# Patient Record
Sex: FEMALE | Race: OTHER | HISPANIC OR LATINO | Employment: FULL TIME | URBAN - METROPOLITAN AREA
[De-identification: names, ages, dates, MRNs, and addresses within clinical notes are randomized per-mention and may not be internally consistent; named-entity substitution may affect disease eponyms.]

---

## 2023-03-17 ENCOUNTER — HOSPITAL ENCOUNTER (EMERGENCY)
Facility: HOSPITAL | Age: 53
Discharge: HOME | End: 2023-03-18
Attending: EMERGENCY MEDICINE

## 2023-03-17 VITALS
DIASTOLIC BLOOD PRESSURE: 64 MMHG | TEMPERATURE: 98.1 F | RESPIRATION RATE: 18 BRPM | OXYGEN SATURATION: 97 % | WEIGHT: 161.2 LBS | HEART RATE: 81 BPM | SYSTOLIC BLOOD PRESSURE: 121 MMHG | BODY MASS INDEX: 29.66 KG/M2 | HEIGHT: 62 IN

## 2023-03-17 DIAGNOSIS — S99.911A RIGHT ANKLE INJURY, INITIAL ENCOUNTER: Primary | ICD-10-CM

## 2023-03-18 ENCOUNTER — APPOINTMENT (EMERGENCY)
Dept: RADIOLOGY | Facility: HOSPITAL | Age: 53
End: 2023-03-18

## 2023-03-18 RX ORDER — ACETAMINOPHEN 325 MG/1
650 TABLET ORAL ONCE
Status: COMPLETED | OUTPATIENT
Start: 2023-03-18 | End: 2023-03-18

## 2023-03-18 RX ADMIN — ACETAMINOPHEN 650 MG: 325 TABLET, FILM COATED ORAL at 01:41

## 2023-03-18 NOTE — ED PROVIDER NOTES
History  Chief Complaint   Patient presents with   • Ankle Injury     Pt arrived ambulatory with c/o rolling R ankle after stepping on ice cube at work yesterday  Pt report swelling and difficulty stepping on R foot  +pulse     55-year-old female with no reported past medical history presenting to the ED with a complaint of right ankle pain  Patient reports last night she was walking in her driveway, slipped on an ice cube and twisted her ankle  Patient unsure the exact mechanism of injury  States she thinks she rolled her ankle out  She denies any pain or deformity at that time  Reports she has been able to walk on the ankle up until today when she started to have pain in the ankle  She also noticed swelling and some bruising over the lateral aspect of the ankle  She did not fall to the ground or hit her head  She denies any other complaints of pain today  Reports she has been taking Tylenol for the pain which does relieve her pain  She has no pain at rest but it is increased with ambulation  She has no right foot, R medial ankle,R lower leg, R knee, R hip or back pain  No RLE weakness, paresthesias or anesthesias  Denies any symptoms prior to rolling her ankle  Reports prior to this incident she has otherwise been feeling well   used  None       History reviewed  No pertinent past medical history  Past Surgical History:   Procedure Laterality Date   • HYSTERECTOMY  2008       History reviewed  No pertinent family history  I have reviewed and agree with the history as documented  E-Cigarette/Vaping     E-Cigarette/Vaping Substances     Social History     Tobacco Use   • Smoking status: Never   • Smokeless tobacco: Never   Substance Use Topics   • Alcohol use: Never   • Drug use: Never       Review of Systems   Musculoskeletal:        (+) R ankle pain   All other systems reviewed and are negative  Physical Exam  Physical Exam  Vitals and nursing note reviewed  Constitutional:       General: She is not in acute distress  Appearance: She is well-developed  Comments: Awake, alert, interactive and resting in the stretcher in no acute distress  Patient is not ill or toxic appearing  HENT:      Head: Normocephalic and atraumatic  Eyes:      Conjunctiva/sclera: Conjunctivae normal    Cardiovascular:      Rate and Rhythm: Normal rate and regular rhythm  Heart sounds: No murmur heard  Pulmonary:      Effort: Pulmonary effort is normal  No respiratory distress  Breath sounds: Normal breath sounds  Abdominal:      Palpations: Abdomen is soft  Tenderness: There is no abdominal tenderness  Musculoskeletal:         General: No swelling  Cervical back: Neck supple  Comments: TTP, swelling and mild bruising over the right lateral malleoli  There is no deformity  There is no TTP over the right anterior ankle, medial malleoli, forefoot, foot, base of the fifth metatarsal, right lower leg, right knee, or right hip  No skin disruption  No midline or paraspinal TTP over the C/T/L-spine  Full, painless ROM intact in the right ankle  There is no ligamentous laxity over the right ankle  Right PT pulse 2+  Distal cap refill less than 2 seconds  Sensation and motor intact in the RLE  Strength 5/5 with ankle plantarflexion, dorsiflexion, inversion and eversion  No other concerning findings over the RLE  Skin:     General: Skin is warm and dry  Capillary Refill: Capillary refill takes less than 2 seconds  Neurological:      Mental Status: She is alert     Psychiatric:         Mood and Affect: Mood normal          Vital Signs  ED Triage Vitals [03/17/23 2311]   Temperature Pulse Respirations Blood Pressure SpO2   98 1 °F (36 7 °C) 81 18 121/64 97 %      Temp Source Heart Rate Source Patient Position - Orthostatic VS BP Location FiO2 (%)   Tympanic Monitor Sitting Left arm --      Pain Score       --           Vitals:    03/17/23 2311   BP: 121/64   Pulse: 81   Patient Position - Orthostatic VS: Sitting         Visual Acuity      ED Medications  Medications   acetaminophen (TYLENOL) tablet 650 mg (has no administration in time range)       Diagnostic Studies  Results Reviewed     None                 XR ankle 3+ views RIGHT    (Results Pending)              Procedures  Procedures         ED Course  ED Course as of 03/18/23 2203   Sat Mar 18, 2023   0200 No acute osseous abnormality appreciated on right ankle x-ray  Pending official read  7810 Will provide patient with crutches and an Aircast in light of no acute osseous abnormality and no ligamentous laxity on right ankle exam   0221 Advised patient to continue acetaminophen as needed for pain control  Advised to consider ice as needed for symptomatic control  Use crutches as needed for ambulatory support  Follow-up PCP in 2 days for reevaluation and further management  Strict return precautions verbally communicated to the patient as outlined in the AVS   All patient questions and concerns were answered  Patient verbally communicated their understanding and agreement to the above plan  Patient stable at discharge  Portions of the record may have been created with voice recognition software  Occasional wrong word or "sound a like" substitutions may have occurred due to the inherent limitations of voice recognition software  Read the chart carefully and recognize, using context, where substitutions have occurred  Medical Decision Making  DDX including but not limited to: sprain, strain, fracture  Will obtain x-ray imaging of the right ankle for further evaluation  Will provide pain control with acetaminophen  No other indications at this time for further labs or imaging  Will reevaluate after x-ray      Right ankle injury, initial encounter: acute illness or injury      Disposition  Final diagnoses:   None     ED Disposition None      Follow-up Information    None         Patient's Medications    No medications on file       No discharge procedures on file      PDMP Review     None          ED Provider  Electronically Signed by           Geno Garcia PA-C  03/18/23 6615

## 2023-03-18 NOTE — DISCHARGE INSTRUCTIONS
Vuelva al ED si tiene alguna inquietud mary se describe en el AVS o si tiene cualquier otra inquietud  Lenin un seguimiento con pereyra proveedor de atención primaria en 2 días para shine reevaluación y [employer]     Use paracetamol según sea necesario para controlar el dolor  Considere reposo, hielo y elevación cuando esté sentado para ayudar con la hinchazón  Hielo 20 minutos encendido y 21 minutos apagado